# Patient Record
Sex: FEMALE | Race: BLACK OR AFRICAN AMERICAN | URBAN - METROPOLITAN AREA
[De-identification: names, ages, dates, MRNs, and addresses within clinical notes are randomized per-mention and may not be internally consistent; named-entity substitution may affect disease eponyms.]

---

## 2017-01-06 ENCOUNTER — IMPORTED ENCOUNTER (OUTPATIENT)
Dept: URBAN - METROPOLITAN AREA CLINIC 50 | Facility: CLINIC | Age: 67
End: 2017-01-06

## 2017-02-22 ENCOUNTER — IMPORTED ENCOUNTER (OUTPATIENT)
Dept: URBAN - METROPOLITAN AREA CLINIC 50 | Facility: CLINIC | Age: 67
End: 2017-02-22

## 2017-03-03 ENCOUNTER — IMPORTED ENCOUNTER (OUTPATIENT)
Dept: URBAN - METROPOLITAN AREA CLINIC 50 | Facility: CLINIC | Age: 67
End: 2017-03-03

## 2017-03-29 ENCOUNTER — IMPORTED ENCOUNTER (OUTPATIENT)
Dept: URBAN - METROPOLITAN AREA CLINIC 50 | Facility: CLINIC | Age: 67
End: 2017-03-29

## 2017-04-19 ENCOUNTER — IMPORTED ENCOUNTER (OUTPATIENT)
Dept: URBAN - METROPOLITAN AREA CLINIC 50 | Facility: CLINIC | Age: 67
End: 2017-04-19

## 2017-04-24 ENCOUNTER — IMPORTED ENCOUNTER (OUTPATIENT)
Dept: URBAN - METROPOLITAN AREA CLINIC 50 | Facility: CLINIC | Age: 67
End: 2017-04-24

## 2017-05-03 ENCOUNTER — IMPORTED ENCOUNTER (OUTPATIENT)
Dept: URBAN - METROPOLITAN AREA CLINIC 50 | Facility: CLINIC | Age: 67
End: 2017-05-03

## 2017-05-08 ENCOUNTER — IMPORTED ENCOUNTER (OUTPATIENT)
Dept: URBAN - METROPOLITAN AREA CLINIC 50 | Facility: CLINIC | Age: 67
End: 2017-05-08

## 2017-05-12 ENCOUNTER — IMPORTED ENCOUNTER (OUTPATIENT)
Dept: URBAN - METROPOLITAN AREA CLINIC 50 | Facility: CLINIC | Age: 67
End: 2017-05-12

## 2017-05-17 ENCOUNTER — IMPORTED ENCOUNTER (OUTPATIENT)
Dept: URBAN - METROPOLITAN AREA CLINIC 50 | Facility: CLINIC | Age: 67
End: 2017-05-17

## 2017-05-17 NOTE — PATIENT DISCUSSION
"""Patient doing well after cataract surgery OD. Instructed patient to continue drops/medications and follow post-op instructions as directed.  If redness

## 2017-06-21 ENCOUNTER — IMPORTED ENCOUNTER (OUTPATIENT)
Dept: URBAN - METROPOLITAN AREA CLINIC 50 | Facility: CLINIC | Age: 67
End: 2017-06-21

## 2017-09-27 ENCOUNTER — IMPORTED ENCOUNTER (OUTPATIENT)
Dept: URBAN - METROPOLITAN AREA CLINIC 50 | Facility: CLINIC | Age: 67
End: 2017-09-27

## 2017-11-08 ENCOUNTER — IMPORTED ENCOUNTER (OUTPATIENT)
Dept: URBAN - METROPOLITAN AREA CLINIC 50 | Facility: CLINIC | Age: 67
End: 2017-11-08

## 2017-12-04 ENCOUNTER — IMPORTED ENCOUNTER (OUTPATIENT)
Dept: URBAN - METROPOLITAN AREA CLINIC 50 | Facility: CLINIC | Age: 67
End: 2017-12-04

## 2018-09-05 ENCOUNTER — IMPORTED ENCOUNTER (OUTPATIENT)
Dept: URBAN - METROPOLITAN AREA CLINIC 50 | Facility: CLINIC | Age: 68
End: 2018-09-05

## 2018-09-05 NOTE — PATIENT DISCUSSION
"""Continue Restasis both eyes twice a day. "" ""Start Artificial tears both eyes two - four times a day.  """

## 2019-06-12 ENCOUNTER — IMPORTED ENCOUNTER (OUTPATIENT)
Dept: URBAN - METROPOLITAN AREA CLINIC 50 | Facility: CLINIC | Age: 69
End: 2019-06-12

## 2019-06-12 NOTE — PATIENT DISCUSSION
"""Minimal SPK OU"" ""Continue Restasis both eyes twice a day ."" ""Start Artificial tears both eyes two - four times a day . """

## 2021-04-16 ASSESSMENT — VISUAL ACUITY
OD_CC: 20/20
OS_CC: 20/20-1
OD_SC: 20/25-2
OS_CC: J1+
OS_OTHER: >20/400. >20/400.
OS_OTHER: 20/60. 20/70.
OD_BAT: 20/200
OS_BAT: >20/400
OS_CC: 20/20
OD_SC: 20/30
OD_CC: 20/20
OD_BAT: 20/20-2
OD_CC: J1+
OD_OTHER: >20/400.
OS_CC: 20/20
OD_OTHER: 20/200. >20/400.
OD_CC: 20/20
OD_OTHER: 20/200. >20/400.
OS_BAT: 20/20
OD_SC: 20/25-2
OS_PH: 20/30
OS_CC: 20/25-1
OS_PH: 20/25-1
OS_OTHER: 20/20. 20/20.
OS_SC: 20/50
OS_SC: 20/40
OD_BAT: 20/50
OD_OTHER: 20/20-2. 20/20-2.
OS_CC: 20/400
OS_SC: 20/25
OS_BAT: 20/60
OD_BAT: 20/200
OS_SC: 20/20
OS_OTHER: >20/400.
OD_SC: 20/20
OD_CC: 20/20-2
OS_SC: 20/40
OS_OTHER: >20/400.
OD_SC: 20/25-
OS_BAT: >20/400
OD_SC: 20/60
OD_OTHER: 20/50. 20/60.

## 2021-04-16 ASSESSMENT — TONOMETRY
OD_IOP_MMHG: 14
OS_IOP_MMHG: 24
OS_IOP_MMHG: 12
OD_IOP_MMHG: 21
OS_IOP_MMHG: 21
OD_IOP_MMHG: 14
OD_IOP_MMHG: 16
OD_IOP_MMHG: 11
OS_IOP_MMHG: 15
OS_IOP_MMHG: 14
OD_IOP_MMHG: 16
OD_IOP_MMHG: 18
OS_IOP_MMHG: 19
OD_IOP_MMHG: 16
OD_IOP_MMHG: 16
OS_IOP_MMHG: 17
OS_IOP_MMHG: 16
OS_IOP_MMHG: 12
OD_IOP_MMHG: 28
OS_IOP_MMHG: 16

## 2022-10-04 NOTE — PATIENT DISCUSSION
"""Continue Restasis both eyes twice a day. "" ""Continue PFAT's both eyes two - four times a day.  """ Yes